# Patient Record
(demographics unavailable — no encounter records)

---

## 2025-05-20 NOTE — COUNSELING
[Nutrition/ Exercise/ Weight Management] : nutrition, exercise, weight management [Body Image] : body image [Contraception/ Emergency Contraception/ Safe Sexual Practices] : contraception, emergency contraception, safe sexual practices [Lab Results] : lab results [Medication Management] : medication management

## 2025-05-20 NOTE — PLAN
[FreeTextEntry1] : JUAN DIEGO LAM is a 16 year female for  culture.       Pt with regular cycles.  Discussed birth control and future pregnancy.    ocp

## 2025-05-20 NOTE — HISTORY OF PRESENT ILLNESS
The patient is a 71y Female complaining of
[TextBox_4] : JUAN DIEGO LAM is a 16 year female for pap and  anul  Pt with regular cycles.  Discussed birth control and future pregnancy.